# Patient Record
Sex: FEMALE
[De-identification: names, ages, dates, MRNs, and addresses within clinical notes are randomized per-mention and may not be internally consistent; named-entity substitution may affect disease eponyms.]

---

## 2018-01-11 ENCOUNTER — HOSPITAL ENCOUNTER (INPATIENT)
Dept: HOSPITAL 14 - H.OPSURG | Age: 38
LOS: 1 days | Discharge: HOME | DRG: 743 | End: 2018-01-12
Attending: OBSTETRICS & GYNECOLOGY | Admitting: OBSTETRICS & GYNECOLOGY
Payer: COMMERCIAL

## 2018-01-11 VITALS — BODY MASS INDEX: 36.5 KG/M2

## 2018-01-11 DIAGNOSIS — N80.1: ICD-10-CM

## 2018-01-11 DIAGNOSIS — N80.5: ICD-10-CM

## 2018-01-11 DIAGNOSIS — N32.9: ICD-10-CM

## 2018-01-11 DIAGNOSIS — N80.3: Primary | ICD-10-CM

## 2018-01-11 DIAGNOSIS — M54.40: ICD-10-CM

## 2018-01-11 DIAGNOSIS — N80.8: ICD-10-CM

## 2018-01-11 DIAGNOSIS — N80.0: ICD-10-CM

## 2018-01-11 DIAGNOSIS — N80.2: ICD-10-CM

## 2018-01-11 DIAGNOSIS — N73.6: ICD-10-CM

## 2018-01-11 DIAGNOSIS — N83.202: ICD-10-CM

## 2018-01-11 LAB
ERYTHROCYTE [DISTWIDTH] IN BLOOD BY AUTOMATED COUNT: 13.4 % (ref 11.5–14.5)
HGB BLD-MCNC: 12.9 G/DL (ref 12–16)
MCH RBC QN AUTO: 29.1 PG (ref 27–31)
MCHC RBC AUTO-ENTMCNC: 32.5 G/DL (ref 33–37)
MCV RBC AUTO: 89.6 FL (ref 81–99)
PLATELET # BLD: 207 K/UL (ref 130–400)
RBC # BLD AUTO: 4.43 MIL/UL (ref 3.8–5.2)
WBC # BLD AUTO: 5.1 K/UL (ref 4.8–10.8)

## 2018-01-11 PROCEDURE — 0BBT0ZZ EXCISION OF DIAPHRAGM, OPEN APPROACH: ICD-10-PCS | Performed by: OBSTETRICS & GYNECOLOGY

## 2018-01-11 PROCEDURE — 0DBJ0ZZ EXCISION OF APPENDIX, OPEN APPROACH: ICD-10-PCS | Performed by: SURGERY

## 2018-01-11 PROCEDURE — 0DBW0ZZ EXCISION OF PERITONEUM, OPEN APPROACH: ICD-10-PCS | Performed by: OBSTETRICS & GYNECOLOGY

## 2018-01-11 PROCEDURE — 0TBB0ZZ EXCISION OF BLADDER, OPEN APPROACH: ICD-10-PCS | Performed by: OBSTETRICS & GYNECOLOGY

## 2018-01-11 PROCEDURE — 8E0W0CZ ROBOTIC ASSISTED PROCEDURE OF TRUNK REGION, OPEN APPROACH: ICD-10-PCS | Performed by: OBSTETRICS & GYNECOLOGY

## 2018-01-11 PROCEDURE — 0UB20ZZ EXCISION OF BILATERAL OVARIES, OPEN APPROACH: ICD-10-PCS | Performed by: OBSTETRICS & GYNECOLOGY

## 2018-01-11 PROCEDURE — 0UT60ZZ RESECTION OF LEFT FALLOPIAN TUBE, OPEN APPROACH: ICD-10-PCS | Performed by: OBSTETRICS & GYNECOLOGY

## 2018-01-11 PROCEDURE — 0DBP0ZZ EXCISION OF RECTUM, OPEN APPROACH: ICD-10-PCS | Performed by: SURGERY

## 2018-01-11 PROCEDURE — 0DBH0ZZ EXCISION OF CECUM, OPEN APPROACH: ICD-10-PCS | Performed by: SURGERY

## 2018-01-11 RX ADMIN — HYDROMORPHONE HYDROCHLORIDE PRN MG: 1 INJECTION, SOLUTION INTRAMUSCULAR; INTRAVENOUS; SUBCUTANEOUS at 15:25

## 2018-01-11 RX ADMIN — HYDROMORPHONE HYDROCHLORIDE PRN MG: 1 INJECTION, SOLUTION INTRAMUSCULAR; INTRAVENOUS; SUBCUTANEOUS at 15:40

## 2018-01-11 RX ADMIN — HYDROMORPHONE HYDROCHLORIDE PRN MG: 1 INJECTION, SOLUTION INTRAMUSCULAR; INTRAVENOUS; SUBCUTANEOUS at 15:58

## 2018-01-11 RX ADMIN — HYDROMORPHONE HYDROCHLORIDE PRN MG: 1 INJECTION, SOLUTION INTRAMUSCULAR; INTRAVENOUS; SUBCUTANEOUS at 14:50

## 2018-01-11 RX ADMIN — HYDROMORPHONE HYDROCHLORIDE PRN MG: 1 INJECTION, SOLUTION INTRAMUSCULAR; INTRAVENOUS; SUBCUTANEOUS at 14:40

## 2018-01-11 RX ADMIN — HYDROMORPHONE HYDROCHLORIDE PRN MG: 1 INJECTION, SOLUTION INTRAMUSCULAR; INTRAVENOUS; SUBCUTANEOUS at 15:05

## 2018-01-11 NOTE — RAD
PROCEDURE:  CHEST RADIOGRAPH, 1 VIEW



HISTORY:

post op eval



COMPARISON:

None available.



FINDINGS:



LUNGS:

Patient rotated toward the left. For questionable fibrotic changes 

identified in the inferior lung zones medially bilaterally though 

limited airspace disease not excluded here on acute or subacute 

basis.  Further clinical correlation is advised.



PLEURA:

No pneumothorax or pleural fluid seen.



CARDIOVASCULAR:

Normal.



OSSEOUS STRUCTURES:

No significant abnormalities.



VISUALIZED UPPER ABDOMEN:

Normal.



OTHER FINDINGS:

None. 



IMPRESSION:

Questionable airspace disease medial bases bilaterally with linear 

atelectasis or fibrosis seen at the bases as well.

## 2018-01-11 NOTE — OP
PROCEDURE DATE:  01/11/2018



SURGEON:  Watson Patton MD.



ASSISTED BY:  Luis Armando Ford MD and about 2 hours into the surgery by Dr. Pugh.



PREOPERATIVE DIAGNOSES:  Pelvic pain; bladder pain; abdominal pain; lower

back pain; sciatic pain; history of advanced endometriosis, stage IV; left

ovarian cyst.



POSTOPERATIVE DIAGNOSES:  Pelvic pain; bladder pain; abdominal pain; lower

back pain; sciatic pain; history of advanced endometriosis, stage IV; left

ovarian cyst plus stage IV endometriosis extending to the sigmoid, the

cecum and appendix and the right hemidiaphragm.



PROCEDURE PERFORMED:  A cystoscopy with bilateral ureteral catheterization

and injection of ICG dye; hysteroscopy, diagnostic; da Darren robotic

excision of endometriosis; excision of anterior bladder mass; left

salpingectomy; left ovarian cystectomy and removal of endometrioma;

bilateral ureterolysis and to be dictated separately by Dr. Ford as a

separate procedure, multiple excision of rectosigmoid masses, right

appendectomy and excision of diaphragmatic endometriosis and repair.  He

will dictate these separately.



ANESTHESIA:  General endotracheal.



ANESTHESIOLOGIST:  Emely Rowe MD.



ESTIMATED BLOOD LOSS:  100 mL.



FLUIDS:  1800 mL.



URINE OUTPUT:  500 mL.



PATHOLOGY:  Samples of extensive peritoneal endometriosis from the left

pelvic sidewall, left ovarian fossa, left periureteral, right periureteral,

right ovarian fossa, posterior cervical, anterior rectal, anterior bladder

wall, appendix, portion of cecum and portion of diaphragm.



COMPLICATIONS:  None.



INDICATION FOR THE PROCEDURE:  The patient is a 37-year-old female who

presented with a prolonged history of severe pain, not controlled with

medical treatment.  The patient had a prior surgery at [facility]

in [place] with gynecologic-oncology, it was an extensive operation, but

the patient underwent recurrence within a year with both clinical and

ultrasonographic evidence of recurrent pelvic mass, a completely

obliterated cul-de-sac and extensive pelvic adhesions.  The patient was

counseled with regards to the benefits of the surgery and the likelihood of

the surgery to solve her problem as well as with the potential risks of the

surgery involving both bladder and bowel complications including, but not

limited to fistulas, leak of bowel repairs as well as potential need for a

chest tube if endometriosis on the diaphragm.  After adequate counseling,

the patient signed a consent and she was taken to the OR.



DESCRIPTION OF PROCEDURE:  After adequate anesthesia, the patient was

placed in a dorsal lithotomy position and she was prepped and draped with

greater tension to avoid any part of the body that will be subjected to

pressure and those parts of the body were extensively padded.  At this

point, a timeout was called according to hospital policies and after this

was done, attention was in the vaginal area where a cystoscope was inserted

into the urethra under direct visualization.  A pancystoscopy was performed

revealing no evidence of mucosal lesions although there appeared to be an

area of compression on the dome of the bladder.  Both ureteral ostia were

in the normal anatomical position and the left ureter at that point was

cannulized utilizing a 5-Moroccan open-ended catheter and 5 mL were injected

of IC Green into the distal catheter.  After removing the catheter,

attention was on the right side where the open-ended catheter was advanced

to the distal ureter and 5 mL of IC Green were injected into the distal

ureter.  This procedure was essential to the performance of this extremely

difficult procedure.  At this point, the cystoscope was removed and a

16-Moroccan Jay was replaced.  Attention was then in the vaginal area where

a speculum was placed in the vagina.  The anterior lip of the cervix was

grasped and the cervix was dilated.  At this point, the uterine manipulator

was placed in the uterus and attention was at the abdomen where an open

laparoscopy technique was used to enter the umbilicus and enter the

peritoneum, which was entered in a blunt fashion and the cannula was then

placed.  The abdomen was desufflated and under direct visualization, three

additional trocars were placed, an 8 mm left upper quadrant, a 10 mm left

mid quadrant and an 8 mm right upper quadrant.  At this point, the abdomen

and pelvis was visualized with the following findings.  There were

endometriotic implants in the right hemidiaphragm and in the pericardial

area.  There were endometriosis implants on the cecum right by the

ileocecal valve and on the right appendix.  There were endometriosis

implants on the anterior bladder with a very large bladder mass, thick

involving all the way almost down to the round ligament on the left.  There

were endometriosis implants on the left fallopian tube, which was

completely distorted and it lost its normal structure.  There was a large 7

cm left ovarian endometrioma.  The cul-de-sac was completely obliterated

with significant endometriotic implants in the anterior aspect of the

sigmoid and the right ovary was attached to the right pelvic sidewall.  The

first part of the procedure at this point utilizing robotic scissors and

the bipolar grasper, the ovary was elevated and the endometrioma was

drained with minimal spillage.  At this point, the ovary was completely

elevated out of the pelvis and the left ureter was visualized.  The uterus

was twisted towards the right hand side.  The peritoneum was then grasped

and the retroperitoneal space was entered.  The ureter was identified

utilizing fluorescent technology and a full dissection was performed

opening up the peritoneum and lateralizing the ureter.  Similarly on the

right hand side, the right ovary was also elevated with some difficulty. 

Large amounts of chocolate type fluid appeared to come out, out of a pocket

that was created, but it was not endometrioma.  The ureter again on the

right hand side was identified and it was followed all the way down from

the pelvic rim all the way to the ureteral canal.  On the right hand side,

there were very significant fibrotic adhesions involving the right ureter. 

The ureter was firmly attached and kinked by fibrotic adhesions.  A very

careful dissection was performed dissecting completely the ovarian fossa

and freeing up the ureter, which was also slightly kinked with a large

amount of fibrotic tissue being removed.  Multiple samples were sent then

to pathology.  At this point, attention was on the posterior aspect of the

uterus where an extensive area of fibrotic endometriosis, deep

infiltrating, was visible on the left ovarian fossa.  This was dissected

completely with extreme care to avoid the ovarian artery as well as the

ureter, which was always kept in vision utilizing fluorescent technology. 

Once this was done, Dr. Ford took over the console and dissected the

rectum off the posterior aspect of the uterus.  He will dictate this

procedure separately.  Additionally Dr. Ford dissected multiple

superficial endometriotic lesions on the serosa of the rectum, which were

dissected off without entering the cavity.  At this point, attention was on

the anterior aspect of the uterus where a large bladder wall mass

containing endometriosis was progressively dissected without entering the

bladder proper or damaging the muscularis.  This large mass was dissected

off with great care and again sent over to Pathology.  The left fallopian

tube was completely destroyed by the endometriosis and it did not have a

normal shape and had deep invasive lesions of endometriosis.  So, as this

was prior discussed with the patient and which the patient was consented

for, a left salpingectomy was performed cutting along the mesosalpinx with

extreme care not to damage any vascular supply to the ovary.  At this

point, a left ovarian cystectomy was performed by bluntly  the

endometrioma from the healthy ovarian tissue, this was removed in total in

one sample and sent over to Pathology.  There was minimal bleeding from

this procedure.  At this point, it was checked for hemostasis, there

appeared to be excellent and Dr. Ford will dictate separately in detail,

but I will just summarize that this was a 3-step procedure, which involved

undocking and re-docking and repositioning the da Darren robot, two

additional times, one to proceed with the dissection of endometriosis by

the cecum as well as the appendix and the second time and third part of the

procedure were excision of diaphragmatic endometriosis was performed on the

diaphragm.  At the end of the procedure, it was checked for hemostasis,

there appeared to be excellent.  The robot was undocked, the abdomen was

desufflated, the instruments were removed and the incision was closed in

layers with 0-PDS for the fascia and 4-0 Monocryl for the skin.  At the end

of the procedure, all tapes and instruments were correct.  The patient

tolerated the procedure well, was taken to recovery room in excellent

condition.





__________________________________________

Watson Patton MD



DD:  01/11/2018 16:12:14

DT:  01/11/2018 18:19:04

Job # [id number]

## 2018-01-12 VITALS
OXYGEN SATURATION: 98 % | RESPIRATION RATE: 17 BRPM | DIASTOLIC BLOOD PRESSURE: 60 MMHG | HEART RATE: 84 BPM | TEMPERATURE: 99.9 F | SYSTOLIC BLOOD PRESSURE: 109 MMHG

## 2018-01-12 RX ADMIN — OXYCODONE HYDROCHLORIDE AND ACETAMINOPHEN PRN TAB: 5; 325 TABLET ORAL at 08:42

## 2018-01-12 RX ADMIN — OXYCODONE HYDROCHLORIDE AND ACETAMINOPHEN PRN TAB: 5; 325 TABLET ORAL at 04:14

## 2018-01-12 NOTE — CP.PCM.DIS
Provider





- Provider


Date of Admission: 


01/11/18 15:11





Attending physician: 


Watson Patton





Primary care physician: 


Watson Patton





Consults: 





None


Time Spent in preparation of Discharge (in minutes): 35





Diagnosis





- Discharge Diagnosis


(1) Endometriosis


Status: Acute   





(2) Endometriosis of appendix


Status: Acute   





(3) Endometriosis of bladder


Status: Acute   





(4) Endometriosis of colon


Status: Acute   





(5) Endometriosis of fallopian tube


Status: Acute   





(6) Endometriosis of intestine


Status: Acute   





(7) Endometriosis of pelvis


Status: Acute   





(8) Endometriosis of peritoneum


Status: Acute   





(9) Endometriosis of uterus


Status: Acute   





(10) Endometriosis, rectum


Status: Acute   





(11) Endometriosis, ovary


Status: Acute   





(12) Endometriosis, severe


Status: Acute   





(13) Implanted endometriosis


Status: Acute   





(14) Endometriosis, site unspecified


Status: Acute   





(15) Ovarian cyst, left


Status: Acute   





(16) S/P cystoscopy


Status: Acute   





(17) Status post hysteroscopy


Status: Acute   





(18) Ureteral adhesion, left


Status: Acute   





(19) Ureteral adhesion, right


Status: Acute   





(20) Endometrioma of ovary


Status: Acute   





Hospital Course





- Lab Results


Lab Results: 


 Most Recent Lab Values











WBC  5.1 K/uL (4.8-10.8)   01/11/18  06:40    


 


RBC  4.43 Mil/uL (3.80-5.20)   01/11/18  06:40    


 


Hgb  12.9 g/dL (12.0-16.0)   01/11/18  06:40    


 


Hct  39.7 % (34.0-47.0)   01/11/18  06:40    


 


MCV  89.6 fl (81.0-99.0)   01/11/18  06:40    


 


MCH  29.1 pg (27.0-31.0)   01/11/18  06:40    


 


MCHC  32.5 g/dL (33.0-37.0)  L  01/11/18  06:40    


 


RDW  13.4 % (11.5-14.5)   01/11/18  06:40    


 


Plt Count  207 K/uL (130-400)   01/11/18  06:40    


 


Blood Type  A POSITIVE   01/11/18  06:40    


 


Blood Type Confirm  A POSITIVE   01/11/18  08:10    


 


Antibody Screen  Negative   01/11/18  06:40    


 


BBK History Checked  No verified bt   01/11/18  06:40    














- Hospital Course


Hospital Course: 





37F w/PMH sig for endometriosis admitted s/p cystoscopy w/bilateral uteral 

catetherization, injection of ICG dye, diagnostic hysteroscopy, da Darren 

robotic excision of extensive endometriosis, excision of anterior bladder mass, 

Left salpinectomy, Left ovarian cystectomy and removal of endometrioma; 

bilateral ureterolysis, multiple excisions of rectosigmoid masses,  

appendectomy with resection of cecum, excision of diaphragmatic endometriosis 

and repair.  Pt did well overnight, pain well controlled. Jay was removed 

this AM- pt urinated post removal.  Tolerating diet.  Pt stable and ready to be 

discharged home. 





Diagnoses


Extensive endometriosis


Lesions on appendix


Lesions on rectosigmoid


Lesions on diaphragm


Lesions on Left fallopian tube


Left ovarian cyst


Left ovarian endometrioma


Lesions on bilateral ureters


Diagnostic hysteroscopy


Cystoscopy








Discharge Exam





- Head Exam


Head Exam: ATRAUMATIC, NORMAL INSPECTION, NORMOCEPHALIC





- Eye Exam


Eye Exam: EOMI, Normal appearance





- ENT Exam


ENT Exam: Mucous Membranes Moist, Normal Exam





- Neck Exam


Neck exam: Full Rom, Normal Inspection





- Respiratory Exam


Respiratory Exam: NORMAL BREATHING PATTERN, UNREMARKABLE





- Cardiovascular Exam


Cardiovascular Exam: REGULAR RHYTHM, +S1, +S2





- GI/Abdominal Exam


GI & Abdominal Exam: Soft, Tenderness (over surgical incision sites x 4).  

absent: Distended, Firm, Guarding, Rigid





- Extremities Exam


Extremities exam: normal inspection





- Neurological Exam


Neurological exam: Alert, CN II-XII Intact, Oriented x3





- Psychiatric Exam


Psychiatric exam: Normal Affect, Normal Mood





- Skin


Skin Exam: Dry, Intact, Normal Color, Warm


Additional comments: 





abdomen with island bandaids x 4; scant dried sanguinous strike through. 





Discharge Plan





- Follow Up Plan


Condition: GOOD


Disposition: HOME/ ROUTINE


Instructions:  Endometriosis (DC)


Additional Instructions: 


Please follow up with Dr. Patton in the office- call for an appointment in 1-2 

weeks.  No heavy lifting until cleared by Dr. Patton. You have outer bandaids 

which may be removed tomorrow.  Do not shower until they are removed.  Under 

the bandaids are a special glue- do not remove this, it will fall off on its' ok

, ok to shower with it, gently washing with soap and water.   Ok to resume 

normal diet. Please return to hospital if you start to experience fevers or 

chills. 





Prescription for pain medications already given to . 


Referrals: 


Watson Patton [Primary Care Provider] -

## 2018-01-20 NOTE — PCM.OP
Operative Report





- Operative Report


Date of Surgery/Procedure: 01/11/18


Time of Surgery/Procedure: 10:00


Surgeon: Dr. Luis Armando Ford


Assistant: Dr. Watson Patton


Anesthesia/Sedation: general/Dr. Rowe


Pre-Operative Diagnosis: Abdominal pain and endometriosis


Post-Operative Diagnosis: Abdominal pain and endometriosis involving the rectum

, cecum and diaphragm


Indication for Surgery: as above


Operative Findings: Endometriosis involving the rectum, cecum (right colon) and 

diaphragm


Procedure/Operation Description: 1-Complex excision right diaphragm 

endometriosis with complex repair.  2-Right colectomy (ceceum and appendix).  3-

Excision multiple rectal endometriosis (times 3).  Brief Histroy: This is a 37 

year old woman brought to the operating room by Dr. Watson Patton for abdominal 

pain and endometriosis. During the intital robotic exploration he noted 

multiple lesions invovling the rectum, cecum (right colon and appendix) and the 

right diaphragm. Dr. Patton requested intraoperative consultation and this is 

th report of that request.  Description of the procedure: The patient had 

already been brought to the operating room by Dr. Patton (separate dictation 

Dr. Patton). After taking conrol of the robotic consule the first group of 

lesions involving the rectum were examined and with blunt and sharp dissection 

the first (of three)rectal lesion was incised circumfernetially with 

electrocautery and with blunt and sharp dissection it was dissected from the 

rectal wall. This first specimen was appropirately marked and sent to aptholgy 

separately. The other two rectal lesions were excised completely in a similar 

manner and sent to pathology each separately. Our attention then turned to the 

appendix and cecum. As the lesions invovled both structures the cecum and right 

colon were mobilized with blunt and sharp disection with the aid of 

electrocautery. With the right colon mobilized a 45 mm XIAO stpaler was 

introduced into the operative field and the right colon was transected with the 

stapler. The right colon (cecum, right colon and appendix) was removed and 

after being appropriately marked was sent to apthology as a separate en-bloc 

specimen. The operative areas of the rectum and right colectomy sites were 

examined and hemostasis was deemed adeqaute. The robot waas then repositioned 

for the upper right quadrant diaphragm. With the robotic devise in place the 

right dipahragm was examined and three large clusters of endometriosis was 

obvious by videoscopic examination. With initial sharp dissection the fist, 

largest cluster, was incised with electrocautery circumferentially. With blunt 

and sharp dissection the cluster of lesions were excised en-bloc with 

meticulous attention to hemostasis. the diaphragm along with the lesions were 

appropariately marked and sent separately to pathology for examination. After 

reduction of the pneumoperitoneum to about 10 mmHg the diaphragm defect was 

recosntructed with mutliple 3-0 V-Lock sutures. the surgical site of the 

diaphragm resection was examiined and hemostais was deemed adequate. Two 

smaller clusters of endometriosis were dessicated. The operation was then 

turned over to Dr. Patton (separate dictation Dr. Patton).


Estimated Blood Loss: 100 cc


Complications: none


Discharge & Condition: stable